# Patient Record
Sex: FEMALE | Race: AMERICAN INDIAN OR ALASKA NATIVE
[De-identification: names, ages, dates, MRNs, and addresses within clinical notes are randomized per-mention and may not be internally consistent; named-entity substitution may affect disease eponyms.]

---

## 2020-01-09 ENCOUNTER — HOSPITAL ENCOUNTER (OUTPATIENT)
Dept: HOSPITAL 5 - ED | Age: 35
Discharge: HOME | End: 2020-01-09
Attending: UROLOGY
Payer: COMMERCIAL

## 2020-01-09 VITALS — DIASTOLIC BLOOD PRESSURE: 66 MMHG | SYSTOLIC BLOOD PRESSURE: 113 MMHG

## 2020-01-09 DIAGNOSIS — Z87.440: ICD-10-CM

## 2020-01-09 DIAGNOSIS — G43.909: ICD-10-CM

## 2020-01-09 DIAGNOSIS — Z79.899: ICD-10-CM

## 2020-01-09 DIAGNOSIS — T83.512A: ICD-10-CM

## 2020-01-09 DIAGNOSIS — N20.2: Primary | ICD-10-CM

## 2020-01-09 DIAGNOSIS — E66.9: ICD-10-CM

## 2020-01-09 DIAGNOSIS — Z88.8: ICD-10-CM

## 2020-01-09 LAB
BACTERIA #/AREA URNS HPF: (no result) /HPF
BASOPHILS # (AUTO): 0 K/MM3 (ref 0–0.1)
BASOPHILS NFR BLD AUTO: 0.5 % (ref 0–1.8)
BILIRUB UR QL STRIP: (no result)
BLOOD UR QL VISUAL: (no result)
BUN SERPL-MCNC: 9 MG/DL (ref 7–17)
BUN/CREAT SERPL: 10 %
CALCIUM SERPL-MCNC: 9.1 MG/DL (ref 8.4–10.2)
EOSINOPHIL # BLD AUTO: 0.1 K/MM3 (ref 0–0.4)
EOSINOPHIL NFR BLD AUTO: 1.9 % (ref 0–4.3)
HCT VFR BLD CALC: 42.1 % (ref 30.3–42.9)
HEMOLYSIS INDEX: 4
HGB BLD-MCNC: 14.1 GM/DL (ref 10.1–14.3)
LYMPHOCYTES # BLD AUTO: 2.4 K/MM3 (ref 1.2–5.4)
LYMPHOCYTES NFR BLD AUTO: 35.7 % (ref 13.4–35)
MCHC RBC AUTO-ENTMCNC: 33 % (ref 30–34)
MCV RBC AUTO: 80 FL (ref 79–97)
MONOCYTES # (AUTO): 0.5 K/MM3 (ref 0–0.8)
MONOCYTES % (AUTO): 7.2 % (ref 0–7.3)
MUCOUS THREADS #/AREA URNS HPF: (no result) /HPF
PH UR STRIP: 6 [PH] (ref 5–7)
PLATELET # BLD: 266 K/MM3 (ref 140–440)
RBC # BLD AUTO: 5.24 M/MM3 (ref 3.65–5.03)
RBC #/AREA URNS HPF: 44 /HPF (ref 0–6)
UROBILINOGEN UR-MCNC: < 2 MG/DL (ref ?–2)
WBC #/AREA URNS HPF: 88 /HPF (ref 0–6)

## 2020-01-09 PROCEDURE — C1769 GUIDE WIRE: HCPCS

## 2020-01-09 PROCEDURE — 87186 SC STD MICRODIL/AGAR DIL: CPT

## 2020-01-09 PROCEDURE — 52356 CYSTO/URETERO W/LITHOTRIPSY: CPT

## 2020-01-09 PROCEDURE — 85025 COMPLETE CBC W/AUTO DIFF WBC: CPT

## 2020-01-09 PROCEDURE — A4217 STERILE WATER/SALINE, 500 ML: HCPCS

## 2020-01-09 PROCEDURE — 87086 URINE CULTURE/COLONY COUNT: CPT

## 2020-01-09 PROCEDURE — 81001 URINALYSIS AUTO W/SCOPE: CPT

## 2020-01-09 PROCEDURE — 81025 URINE PREGNANCY TEST: CPT

## 2020-01-09 PROCEDURE — 36415 COLL VENOUS BLD VENIPUNCTURE: CPT

## 2020-01-09 PROCEDURE — C1758 CATHETER, URETERAL: HCPCS

## 2020-01-09 PROCEDURE — C2617 STENT, NON-COR, TEM W/O DEL: HCPCS

## 2020-01-09 PROCEDURE — 99285 EMERGENCY DEPT VISIT HI MDM: CPT

## 2020-01-09 PROCEDURE — 80048 BASIC METABOLIC PNL TOTAL CA: CPT

## 2020-01-09 PROCEDURE — 74420 UROGRAPHY RTRGR +-KUB: CPT

## 2020-01-09 PROCEDURE — 87076 CULTURE ANAEROBE IDENT EACH: CPT

## 2020-01-09 NOTE — ANESTHESIA CONSULTATION
Anesthesia Consult and Med Hx


Date of service: 01/09/20





- Airway


Anesthetic Teeth Evaluation: Good (top left broken molar)


ROM Head & Neck: Adequate


Mental/Hyoid Distance: Adequate


Mallampati Class: Class I


Intubation Access Assessment: Good





- Pulmonary Exam


CTA: Yes





- Cardiac Exam


Cardiac Exam: RRR





- Pre-Operative Health Status


ASA Pre-Surgery Classification: ASA1


Proposed Anesthetic Plan: General





- Pulmonary


Hx Smoking: No


Hx Respiratory Symptoms: No


Hx Sleep Apnea: No (HARDIK PRE SCREEN LOW RISK)





- Cardiovascular System


Hx Hypertension: No


Hx Heart Attack/AMI: No





- Central Nervous System


CVA: No


Hx Back Pain: Yes (FROM STONE)





- Gastrointestinal


Hx Gastroesophageal Reflux Disease: No





- Endocrine


Hx Renal Disease: No


Hx Liver Disease: No


Hx Insulin Dependent Diabetes: No


Hx Non-Insulin Dependent Diabetes: No


Hx Thyroid Disease: No





- Other Systems


Hx Obesity: Yes (BMI 38)





- Additional Comments


Anesthesia Medical History Comments: No hx anesthetic complications.

## 2020-01-09 NOTE — OPERATIVE REPORT
PREOPERATIVE DIAGNOSES:  Impacted lower stone, previous sepsis with the retained

stent, which is calcified.



POSTOPERATIVE DIAGNOSES:  Impacted lower stone, previous sepsis with the

retained stent, which is calcified.



PROCEDURES:  Cystoscopy, right retrograde, right ureteroscopy, laser of stone,

extraction of the fragments, and double-J stent.



SURGEON:  Dr. Hagen.



ANESTHESIA:  General.



FINDINGS:  This is a woman, who presented with sepsis.  She now presents for a

second stage ureteroscopy.  She was canceled multiple times and now has a

calcified stent as well.



DESCRIPTION OF PROCEDURE:  The patient was brought to operating room and placed

on the operating table.  Following the induction of anesthesia, placed in the

lithotomy position and prepped and draped in usual sterile fashion.  A

cystourethroscopy showed the stent, which was calcified.  It was withdrawn from

the meatus and easily removed.  A wire coiled in the kidney and ureteroscopy

showed an embedded stone.  Using the laser, we broke it into about 6-7 pieces. 

Pieces were extracted.  We were able to get above it.  Retrograde showed a

dilated system.  A 6-[place] coiled in the kidney and bladder.  The patient

tolerated the procedure well.  No significant complications and brought to the

recovery in stable condition.





DD: 01/09/2020 17:02

DT: 01/09/2020 17:26

JOB# [id number]  [id number]

LATOYA/ONEAL

## 2020-01-09 NOTE — EVENT NOTE
ED Screening Note


ED Screening Note: 


SUPPOSE TO HAVE SURGERY YEST BUT [employer] DID NOT SEND PROPER AUTORIZATION SO DR VIGIL SENT HER HER FOR ADMIT





SHE HAS STENT THAT HAS CALCIFIED AND NEEDS SURGICAL REMOVAL





SEE PAPER WORK PT HAS 





This initial assessment/diagnostic orders/clinical plan/treatment(s) is/are 

subject to change based on patients health status, clinical progression and re-

assessment by fellow clinical providers in the ED. Further treatment and workup 

at subsequent clinical providers discretion. Patient/guardian urged not to elope

from the ED as their condition may be serious if not clinically assessed and 

managed. 





Initial orders include: 


BASIC LABS


LIKELY ADMIT

## 2020-01-09 NOTE — EMERGENCY DEPARTMENT REPORT
ED General Adult HPI





- General


Chief complaint: Medical Clearance


Stated complaint: STENT REMOVAL


Time Seen by Provider: 01/09/20 11:25


Source: patient


Mode of arrival: Ambulatory


Limitations: No Limitations





- History of Present Illness


Initial comments: 





Patient is a 34-year-old American female who is presenting with the need to have

her urostomy tube removed.  Patient's 2 has been present for months according to

the patient.  Having increased pain.  Tube was placed by Dr. MURPHY.  Patient denies 

fevers chills nausea vomiting diarrhea does have 6 out of 10 pain.  Patient 

initially was to have this removed as an outpatient however she she was sent to 

the emergency department.





- Related Data


                                Home Medications











 Medication  Instructions  Recorded  Confirmed  Last Taken


 


Norethindrone [Jencycla] 0.35 mg PO DAILY 09/20/19 01/08/20 Unknown


 


Ibuprofen [Motrin] 800 mg PO Q8HR PRN 01/08/20 01/08/20 01/07/20











                                    Allergies











Allergy/AdvReac Type Severity Reaction Status Date / Time


 


pollen extracts Allergy  Swelling , Verified 09/20/19 11:57





   NASAL  





   CONGESTION  














ED Review of Systems


ROS: 


Stated complaint: STENT REMOVAL


Other details as noted in HPI





Comment: All other systems reviewed and negative





ED Past Medical Hx





- Past Medical History


Hx Hypertension: No


Hx Heart Attack/AMI: No


Hx Liver Disease: No


Hx Renal Disease: No


Hx Headaches / Migraines: Yes (MIGRAINES)


Hx Seizures: No


Hx Kidney Stones: Yes


Hx HIV: No





- Social History


Smoking Status: Never Smoker


Substance Use Type: None





- Medications


Home Medications: 


                                Home Medications











 Medication  Instructions  Recorded  Confirmed  Last Taken  Type


 


Norethindrone [Jencycla] 0.35 mg PO DAILY 09/20/19 01/08/20 Unknown History


 


Ibuprofen [Motrin] 800 mg PO Q8HR PRN 01/08/20 01/08/20 01/07/20 History














ED Physical Exam





- General


Limitations: No Limitations


General appearance: alert, in no apparent distress





- Head


Head exam: Present: atraumatic, normocephalic





- Eye


Eye exam: Present: normal appearance





- ENT


ENT exam: Present: mucous membranes moist





- Neck


Neck exam: Present: normal inspection





- Respiratory


Respiratory exam: Present: normal lung sounds bilaterally.  Absent: respiratory 

distress, wheezes, rales, rhonchi





- Cardiovascular


Cardiovascular Exam: Present: regular rate, normal rhythm, normal heart sounds. 

 Absent: systolic murmur, diastolic murmur, rubs, gallop





- GI/Abdominal


GI/Abdominal exam: Present: soft, normal bowel sounds.  Absent: distended, 

tenderness, guarding, rebound





- Extremities Exam


Extremities exam: Present: normal inspection





- Back Exam


Back exam: Present: normal inspection, CVA tenderness (R)





- Neurological Exam


Neurological exam: Present: alert, oriented X3





- Psychiatric


Psychiatric exam: Present: normal affect, normal mood





- Skin


Skin exam: Present: warm, dry, intact, normal color.  Absent: rash





ED Course





                                   Vital Signs











  01/09/20





  11:23


 


Temperature 98.1 F


 


Pulse Rate 96 H


 


Respiratory 18





Rate 


 


Blood Pressure 115/84


 


O2 Sat by Pulse 100





Oximetry 














ED Medical Decision Making





- Lab Data


Result diagrams: 


                                 01/09/20 11:42





                                 01/09/20 11:42








                                   Lab Results











  01/09/20 01/09/20 01/09/20 Range/Units





  11:32 11:42 11:42 


 


WBC   6.6   (4.5-11.0)  K/mm3


 


RBC   5.24 H   (3.65-5.03)  M/mm3


 


Hgb   14.1   (10.1-14.3)  gm/dl


 


Hct   42.1   (30.3-42.9)  %


 


MCV   80   (79-97)  fl


 


MCH   27 L   (28-32)  pg


 


MCHC   33   (30-34)  %


 


RDW   12.7 L   (13.2-15.2)  %


 


Plt Count   266   (140-440)  K/mm3


 


Lymph % (Auto)   35.7 H   (13.4-35.0)  %


 


Mono % (Auto)   7.2   (0.0-7.3)  %


 


Eos % (Auto)   1.9   (0.0-4.3)  %


 


Baso % (Auto)   0.5   (0.0-1.8)  %


 


Lymph #   2.4   (1.2-5.4)  K/mm3


 


Mono #   0.5   (0.0-0.8)  K/mm3


 


Eos #   0.1   (0.0-0.4)  K/mm3


 


Baso #   0.0   (0.0-0.1)  K/mm3


 


Seg Neutrophils %   54.7   (40.0-70.0)  %


 


Seg Neutrophils #   3.6   (1.8-7.7)  K/mm3


 


Sodium    140  (137-145)  mmol/L


 


Potassium    4.4  (3.6-5.0)  mmol/L


 


Chloride    106.0  ()  mmol/L


 


Carbon Dioxide    20 L  (22-30)  mmol/L


 


Anion Gap    18  mmol/L


 


BUN    9  (7-17)  mg/dL


 


Creatinine    0.9  (0.7-1.2)  mg/dL


 


Estimated GFR    > 60  ml/min


 


BUN/Creatinine Ratio    10  %


 


Glucose    95  ()  mg/dL


 


Calcium    9.1  (8.4-10.2)  mg/dL


 


Urine Color  Yellow    (Yellow)  


 


Urine Turbidity  Cloudy    (Clear)  


 


Urine pH  6.0    (5.0-7.0)  


 


Ur Specific Gravity  1.014    (1.003-1.030)  


 


Urine Protein  100 mg/dl    (Negative)  mg/dL


 


Urine Glucose (UA)  Neg    (Negative)  mg/dL


 


Urine Ketones  Neg    (Negative)  mg/dL


 


Urine Blood  Lg    (Negative)  


 


Urine Nitrite  Pos    (Negative)  


 


Ur Reducing Substances  Not Reportable    


 


Urine Bilirubin  Neg    (Negative)  


 


Urine Ictotest  Not Reportable    


 


Urine Urobilinogen  < 2.0    (<2.0)  mg/dL


 


Ur Leukocyte Esterase  Lg    (Negative)  


 


Urine WBC (Auto)  88.0 H    (0.0-6.0)  /HPF


 


Urine RBC (Auto)  44.0    (0.0-6.0)  /HPF


 


U Epithel Cells (Auto)  2.0    (0-13.0)  /HPF


 


Urine Bacteria (Auto)  3+    (Negative)  /HPF


 


Urine Mucus  Few    /HPF


 


Urine HCG, Qual  Negative    (Negative)  











Critical care attestation.: 


If time is entered above; I have spent that time in minutes in the direct care 

of this critically ill patient, excluding procedure time.








ED Disposition


Clinical Impression: 


 Infection and inflammatory reaction due to nephrostomy catheter, initial 

encounter





Disposition: DC-09 OP ADMIT IP TO THIS HOSP


Is pt being admited?: Yes


Does the pt Need Aspirin: No


Condition: Stable


Referrals: 


PRIMARY CARE,MD [Primary Care Provider] - 3-5 Days


Time of Disposition: 14:20

## 2020-01-09 NOTE — FLUOROSCOPY REPORT
7 fluoroscopic images submitted



Indication:  Intraoperative localization



Impression:  7 images of the abdomen were submitted for documentation purposes with radiology involve
ment.  Right-sided stone removal and stent exchange performed. Please refer to the operative note for
 complete details. A total of 10 mL of Omnipaque 300 was utilized.



Fluoroscopic time:  26 seconds



Signer Name: Jesse Coronado MD 

Signed: 1/9/2020 5:59 PM

 Workstation Name: [id number]